# Patient Record
Sex: MALE | Race: BLACK OR AFRICAN AMERICAN | NOT HISPANIC OR LATINO | URBAN - METROPOLITAN AREA
[De-identification: names, ages, dates, MRNs, and addresses within clinical notes are randomized per-mention and may not be internally consistent; named-entity substitution may affect disease eponyms.]

---

## 2017-04-27 ENCOUNTER — EMERGENCY (EMERGENCY)
Facility: HOSPITAL | Age: 32
LOS: 1 days | Discharge: HOME | End: 2017-04-27
Attending: EMERGENCY MEDICINE | Admitting: EMERGENCY MEDICINE
Payer: MEDICAID

## 2017-04-27 VITALS
OXYGEN SATURATION: 97 % | DIASTOLIC BLOOD PRESSURE: 108 MMHG | SYSTOLIC BLOOD PRESSURE: 191 MMHG | RESPIRATION RATE: 18 BRPM | TEMPERATURE: 98 F | HEART RATE: 90 BPM

## 2017-04-27 VITALS
OXYGEN SATURATION: 98 % | SYSTOLIC BLOOD PRESSURE: 134 MMHG | DIASTOLIC BLOOD PRESSURE: 83 MMHG | HEART RATE: 77 BPM | RESPIRATION RATE: 18 BRPM
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
## 2017-04-27 DIAGNOSIS — Z79.899 OTHER LONG TERM (CURRENT) DRUG THERAPY: ICD-10-CM

## 2017-04-27 DIAGNOSIS — L72.3 SEBACEOUS CYST: Chronic | ICD-10-CM
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
## 2017-04-27 DIAGNOSIS — M25.551 PAIN IN RIGHT HIP: ICD-10-CM

## 2017-04-27 DIAGNOSIS — Z91.018 ALLERGY TO OTHER FOODS: ICD-10-CM

## 2017-04-27 DIAGNOSIS — R20.2 PARESTHESIA OF SKIN: ICD-10-CM

## 2017-04-27 DIAGNOSIS — M54.5 LOW BACK PAIN: ICD-10-CM

## 2017-04-27 DIAGNOSIS — M25.552 PAIN IN LEFT HIP: ICD-10-CM
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
## 2017-04-27 DIAGNOSIS — Z79.82 LONG TERM (CURRENT) USE OF ASPIRIN: ICD-10-CM

## 2017-04-27 DIAGNOSIS — I10 ESSENTIAL (PRIMARY) HYPERTENSION: ICD-10-CM

## 2017-04-27 LAB
APPEARANCE UR: CLEAR — SIGNIFICANT CHANGE UP
BILIRUB UR-MCNC: NEGATIVE — SIGNIFICANT CHANGE UP
COLOR SPEC: YELLOW — SIGNIFICANT CHANGE UP
DIFF PNL FLD: NEGATIVE — SIGNIFICANT CHANGE UP
GLUCOSE UR QL: NEGATIVE — SIGNIFICANT CHANGE UP
KETONES UR-MCNC: ABNORMAL
LEUKOCYTE ESTERASE UR-ACNC: ABNORMAL
NITRITE UR-MCNC: NEGATIVE — SIGNIFICANT CHANGE UP
PH UR: 5.5 — SIGNIFICANT CHANGE UP (ref 5–8)
PROT UR-MCNC: SIGNIFICANT CHANGE UP
RBC CASTS # UR COMP ASSIST: SIGNIFICANT CHANGE UP /HPF (ref 0–2)
SP GR SPEC: >1.03 — HIGH (ref 1.01–1.02)
UROBILINOGEN FLD QL: NEGATIVE — SIGNIFICANT CHANGE UP

## 2017-04-27 PROCEDURE — 72190 X-RAY EXAM OF PELVIS: CPT | Mod: 26

## 2017-04-27 PROCEDURE — 72190 X-RAY EXAM OF PELVIS: CPT

## 2017-04-27 PROCEDURE — 81001 URINALYSIS AUTO W/SCOPE: CPT

## 2017-04-27 PROCEDURE — 99283 EMERGENCY DEPT VISIT LOW MDM: CPT | Mod: 25

## 2017-04-27 PROCEDURE — 99284 EMERGENCY DEPT VISIT MOD MDM: CPT

## 2017-04-27 RX ORDER — IBUPROFEN 200 MG
600 TABLET ORAL ONCE
Qty: 0 | Refills: 0 | Status: COMPLETED | OUTPATIENT
Start: 2017-04-27 | End: 2017-04-27

## 2017-04-27 RX ADMIN — Medication 600 MILLIGRAM(S): at 14:03

## 2017-04-27 RX ADMIN — Medication 600 MILLIGRAM(S): at 14:55

## 2017-04-27 NOTE — ED PROVIDER NOTE - CONSTITUTIONAL, MLM
normal... Obese male, awake, alert, oriented to person, place, time/situation and in no apparent distress.

## 2017-04-27 NOTE — ED ADULT NURSE REASSESSMENT NOTE - NS ED NURSE REASSESS COMMENT FT1
Received report from MORENO Shabazz. Safety checked. Comfort care provided. Pt encouraged to call for assist when needed. pending re-evaluation and disposition.

## 2017-04-27 NOTE — ED PROVIDER NOTE - OBJECTIVE STATEMENT
30yo obese M with PMH pre-DM, HTN (on no meds), L knee surgery  presenting with b/l hip and lower back pain x 4 weeks. Reports he went to gym 4 weeks ago for 3 days in a row after not going for several months, and used eliptical that he never used before. Was sore after and 1 week later was cleaning at home and heard pop in lower back. Has since had lower back abd b/l hip pain radiating to groin. Pain is constant, occuring at rest, sitting, moving. Took extra strength advil with relief yesterday. Denies fever/chills, neck pain, numbness/tingling, bowel/bladder dysfunction, known injury. 30yo obese M with PMH pre-DM, HTN (on no meds), L knee surgery  presenting with b/l hip and lower back pain x 4 weeks. Reports he went to gym 4 weeks ago for 3 days in a row after not going for several months, and used eliptical that he never used before. Was sore after and 1 week later was cleaning at home and heard pop in lower back. Has since had lower back abd b/l hip pain radiating to groin. Pain is constant, occuring at rest, sitting, moving. Took extra strength advil with relief yesterday. Pt reports chronic L finger tingling since prior shoulder injury. Denies fever/chills, neck pain, new numbness/tingling, bowel/bladder dysfunction, dysuria, hematuria, known injury.

## 2017-04-27 NOTE — ED PROVIDER NOTE - PROGRESS NOTE DETAILS
Patient feels much better, ambulating without difficulty. BP improved. Advised about trace blood in ua. Recommend f/u with pmd.

## 2017-04-27 NOTE — ED PROVIDER NOTE - MEDICAL DECISION MAKING DETAILS
Wilmer: Patient with low back pain x 3-4 weeks, worse with movement, worse with flexion, no bowel/bladder incontinence, no dysuria/hematuria. no tingling. Patient ambulating. States improves with motrin. patient is morbidly obese, will get pelvis xray and pain control, reassess. Likely msk pain. Patient advsied to f/u with ortho.

## 2017-04-27 NOTE — ED PROVIDER NOTE - PLAN OF CARE
- Stay hydrated. Apply ice 20mins on, 40mins off in cycle for first 24-48 hours. Then apply heat.  - Take ibuprofen 600mg every 8hrs for pain as needed. Take with food.  - Follow up with your pcp in 1-2 days.  - Follow up with ortho in 2-3 days.  - Return to ED if symptoms worsen, fever, weakness, numbness or any other concerning symptoms. - Stay hydrated. No heavy lifting. Apply ice 20mins on, 40mins off in cycle for first 24-48 hours. Then apply heat.  - Take ibuprofen 600mg every 8hrs for pain as needed. Take with food.  - Follow up with your Primary Care provider in 1-2 days.  - Follow up with orthopedic in 2-3 days (information provided).  - Return to ED if symptoms worsen, fever, weakness, numbness, bowel or bladder incontinence, or any other concerning symptoms. - Stay hydrated. No heavy lifting. Apply ice 20mins on, 40mins off in cycle for first 24-48 hours. Then apply heat.  - Take ibuprofen 600mg every 8hrs for pain as needed. Take with food.  - Follow up with your Primary Care provider in 1-2 days. Take copy of printed results with you.  - Follow up with orthopedic in 2-3 days (information provided).  - Return to ED if symptoms worsen, fever, weakness, numbness, bowel or bladder incontinence, or any other concerning symptoms.

## 2017-04-27 NOTE — ED PROVIDER NOTE - CARE PLAN
Instructions for follow-up, activity and diet:	- Stay hydrated. Apply ice 20mins on, 40mins off in cycle for first 24-48 hours. Then apply heat.  - Take ibuprofen 600mg every 8hrs for pain as needed. Take with food.  - Follow up with your pcp in 1-2 days.  - Follow up with ortho in 2-3 days.  - Return to ED if symptoms worsen, fever, weakness, numbness or any other concerning symptoms. Instructions for follow-up, activity and diet:	- Stay hydrated. No heavy lifting. Apply ice 20mins on, 40mins off in cycle for first 24-48 hours. Then apply heat.  - Take ibuprofen 600mg every 8hrs for pain as needed. Take with food.  - Follow up with your Primary Care provider in 1-2 days.  - Follow up with orthopedic in 2-3 days (information provided).  - Return to ED if symptoms worsen, fever, weakness, numbness, bowel or bladder incontinence, or any other concerning symptoms. Instructions for follow-up, activity and diet:	- Stay hydrated. No heavy lifting. Apply ice 20mins on, 40mins off in cycle for first 24-48 hours. Then apply heat.  - Take ibuprofen 600mg every 8hrs for pain as needed. Take with food.  - Follow up with your Primary Care provider in 1-2 days. Take copy of printed results with you.  - Follow up with orthopedic in 2-3 days (information provided).  - Return to ED if symptoms worsen, fever, weakness, numbness, bowel or bladder incontinence, or any other concerning symptoms. Principal Discharge DX:	Bilateral low back pain without sciatica, unspecified chronicity  Instructions for follow-up, activity and diet:	- Stay hydrated. No heavy lifting. Apply ice 20mins on, 40mins off in cycle for first 24-48 hours. Then apply heat.  - Take ibuprofen 600mg every 8hrs for pain as needed. Take with food.  - Follow up with your Primary Care provider in 1-2 days. Take copy of printed results with you.  - Follow up with orthopedic in 2-3 days (information provided).  - Return to ED if symptoms worsen, fever, weakness, numbness, bowel or bladder incontinence, or any other concerning symptoms.

## 2017-04-27 NOTE — ED ADULT NURSE NOTE - OBJECTIVE STATEMENT
No distress. alert and orientedX4. Breathing easy and non labored. ambulatory. calm and cooperative. Emotional support offered

## 2018-11-09 NOTE — ED ADULT TRIAGE NOTE - HEART RATE (BEATS/MIN)
90
Implemented All Fall with Harm Risk Interventions:  [place] to call system. Call bell, personal items and telephone within reach. Instruct patient to call for assistance. Room bathroom lighting operational. Non-slip footwear when patient is off stretcher. Physically safe environment: no spills, clutter or unnecessary equipment. Stretcher in lowest position, wheels locked, appropriate side rails in place. Provide visual cue, wrist band, yellow gown, etc. Monitor gait and stability. Monitor for mental status changes and reorient to person, place, and time. Review medications for side effects contributing to fall risk. Reinforce activity limits and safety measures with patient and family. Provide visual clues: red socks.

## 2020-03-18 ENCOUNTER — EMERGENCY (EMERGENCY)
Facility: HOSPITAL | Age: 35
LOS: 1 days | Discharge: HOME | End: 2020-03-18
Attending: EMERGENCY MEDICINE
Payer: COMMERCIAL

## 2020-03-18 VITALS
HEIGHT: 67 IN | DIASTOLIC BLOOD PRESSURE: 99 MMHG | SYSTOLIC BLOOD PRESSURE: 172 MMHG | RESPIRATION RATE: 18 BRPM | HEART RATE: 79 BPM | TEMPERATURE: 99 F | WEIGHT: 315 LBS | OXYGEN SATURATION: 99 %

## 2020-03-18 VITALS
TEMPERATURE: 98 F | RESPIRATION RATE: 17 BRPM | DIASTOLIC BLOOD PRESSURE: 89 MMHG | HEART RATE: 74 BPM | SYSTOLIC BLOOD PRESSURE: 148 MMHG | OXYGEN SATURATION: 100 %

## 2020-03-18 DIAGNOSIS — L72.3 SEBACEOUS CYST: Chronic | ICD-10-CM

## 2020-03-18 LAB — RAPID RVP RESULT: SIGNIFICANT CHANGE UP

## 2020-03-18 PROCEDURE — 99284 EMERGENCY DEPT VISIT MOD MDM: CPT

## 2020-03-18 PROCEDURE — U0001: CPT

## 2020-03-18 PROCEDURE — 87486 CHLMYD PNEUM DNA AMP PROBE: CPT

## 2020-03-18 PROCEDURE — 87633 RESP VIRUS 12-25 TARGETS: CPT

## 2020-03-18 PROCEDURE — 99283 EMERGENCY DEPT VISIT LOW MDM: CPT

## 2020-03-18 PROCEDURE — 87798 DETECT AGENT NOS DNA AMP: CPT

## 2020-03-18 PROCEDURE — 87581 M.PNEUMON DNA AMP PROBE: CPT

## 2020-03-18 NOTE — ED PROVIDER NOTE - NSFOLLOWUPINSTRUCTIONS_ED_ALL_ED_FT
You were seen and evaluated for infection which may be COVID 19. Testing was done. We think you are safe for discharge and to follow up in a few days with your doctor by phone or in person.   You should treat fevers by taking advil or tylenol as needed.    You should stay hydrated and increase the amount of fluid you drink.  Return to the Emergency Department if your shortness of breath becomes worse, you become weak, or new symptoms develop.    You should monitor your temperature and quarantine yourself away from others - particularly the elderly, ill, or immunosuppressed - for the next 14 days, or until you find out that you do not have COVID19.    Should your COVID19 viral swab that was performed today result POSITIVE you will be contacted by phone at the number you provided when registered for this visit.  Your COVID19 test results will not result for up to 7 days.      DO NOT CALL THE EMERGENCY DEPARTMENT FOR YOUR TEST RESULTS, you may call [phone].

## 2020-03-18 NOTE — ED PROVIDER NOTE - NS ED ROS FT
GENERAL: see HPI  HEENT: No trouble swallowing, normal voice/speaking  CARDIAC: No chest pain, edema  RESPIRATORY: see HPI  ABDOMINAL: No abdominal pains or diarrhea  URINARY: No dysuria or frequency    Other review of systems is negative unless otherwise documented

## 2020-03-18 NOTE — ED ADULT NURSE NOTE - OBJECTIVE STATEMENT
34 male hx HTN not on medication here with rhinorrhea and fever 3 weeks ago, r/o covid since he has a known sick contact. no CP, no SOB.

## 2020-03-18 NOTE — ED PROVIDER NOTE - OBJECTIVE STATEMENT
The patient presents out of a concern for a possible COVID infection, requesting testing.  Recent travel to high risk COVID-19 area: [] Yes [*] No  Close contact with lab diagnosed COVID-19: [] Yes [*] No    This patient complains: [*] cough [] sore throat [] myalgias []nausea []shortness of breath []asymptomatic  Associated symptoms:  no chest pain, abdominal pain, difficulty swallowing  Duration of symptoms:  one day  Aggravating/alleviating: none    Social History   Smoking history: [] Yes [*] No

## 2020-03-18 NOTE — ED PROVIDER NOTE - PHYSICAL EXAMINATION
General: Non toxic appearing, vital signs within normal limits  HEENT: airway patent  Respiratory: no noted respiratory distress  Neuro: ambulatory without difficulty  Psych: appropriate mood and affect

## 2020-03-18 NOTE — ED PROVIDER NOTE - PROGRESS NOTE DETAILS
This patient is a non-[facility] employee and seen in the Emergency Department or Emergency Department surge testing area.  Appropriate PPE used by staff.

## 2020-03-18 NOTE — ED PROVIDER NOTE - PATIENT PORTAL LINK FT
You can access the FollowMyHealth Patient Portal offered by [facility] by registering at the following website: [email or web address]. By joining [employer]’s FollowMyHealth portal, you will also be able to view your health information using other applications (apps) compatible with our system.

## 2020-03-18 NOTE — ED PROVIDER NOTE - CLINICAL SUMMARY MEDICAL DECISION MAKING FREE TEXT BOX
Patient presenting for COVID-19 testing.  Will follow current [facility] COVID-19 testing algorithm.  Patient presentation does not suggest severe illness requiring further emergent intervention or hospitalization at this time.

## 2020-03-20 LAB — SARS-COV-2 RNA SPEC QL NAA+PROBE: DETECTED

## 2025-09-06 ENCOUNTER — EMERGENCY (EMERGENCY)
Facility: HOSPITAL | Age: 40
LOS: 1 days | End: 2025-09-06
Attending: EMERGENCY MEDICINE
Payer: MEDICAID

## 2025-09-06 VITALS
TEMPERATURE: 98 F | WEIGHT: 315 LBS | HEIGHT: 69 IN | HEART RATE: 100 BPM | OXYGEN SATURATION: 98 % | RESPIRATION RATE: 18 BRPM | SYSTOLIC BLOOD PRESSURE: 146 MMHG | DIASTOLIC BLOOD PRESSURE: 96 MMHG

## 2025-09-06 DIAGNOSIS — L72.3 SEBACEOUS CYST: Chronic | ICD-10-CM

## 2025-09-06 LAB
ALBUMIN SERPL ELPH-MCNC: 3.4 G/DL — SIGNIFICANT CHANGE UP (ref 3.3–5)
ALBUMIN SERPL ELPH-MCNC: 4 G/DL — SIGNIFICANT CHANGE UP (ref 3.3–5)
ALP SERPL-CCNC: 74 U/L — SIGNIFICANT CHANGE UP (ref 40–120)
ALP SERPL-CCNC: 85 U/L — SIGNIFICANT CHANGE UP (ref 40–120)
ALT FLD-CCNC: 14 U/L — SIGNIFICANT CHANGE UP (ref 10–45)
ALT FLD-CCNC: <5 U/L — LOW (ref 10–45)
ANION GAP SERPL CALC-SCNC: 13 MMOL/L — SIGNIFICANT CHANGE UP (ref 5–17)
ANION GAP SERPL CALC-SCNC: 17 MMOL/L — SIGNIFICANT CHANGE UP (ref 5–17)
AST SERPL-CCNC: 21 U/L — SIGNIFICANT CHANGE UP (ref 10–40)
AST SERPL-CCNC: 59 U/L — HIGH (ref 10–40)
BASOPHILS # BLD AUTO: 0.06 K/UL — SIGNIFICANT CHANGE UP (ref 0–0.2)
BASOPHILS NFR BLD AUTO: 0.9 % — SIGNIFICANT CHANGE UP (ref 0–2)
BILIRUB SERPL-MCNC: 0.4 MG/DL — SIGNIFICANT CHANGE UP (ref 0.2–1.2)
BILIRUB SERPL-MCNC: 0.5 MG/DL — SIGNIFICANT CHANGE UP (ref 0.2–1.2)
BUN SERPL-MCNC: 8 MG/DL — SIGNIFICANT CHANGE UP (ref 7–23)
BUN SERPL-MCNC: 9 MG/DL — SIGNIFICANT CHANGE UP (ref 7–23)
CALCIUM SERPL-MCNC: 8.1 MG/DL — LOW (ref 8.4–10.5)
CALCIUM SERPL-MCNC: 9.6 MG/DL — SIGNIFICANT CHANGE UP (ref 8.4–10.5)
CHLORIDE SERPL-SCNC: 104 MMOL/L — SIGNIFICANT CHANGE UP (ref 96–108)
CHLORIDE SERPL-SCNC: 95 MMOL/L — LOW (ref 96–108)
CO2 SERPL-SCNC: 17 MMOL/L — LOW (ref 22–31)
CO2 SERPL-SCNC: 20 MMOL/L — LOW (ref 22–31)
CREAT SERPL-MCNC: 0.58 MG/DL — SIGNIFICANT CHANGE UP (ref 0.5–1.3)
CREAT SERPL-MCNC: 0.69 MG/DL — SIGNIFICANT CHANGE UP (ref 0.5–1.3)
EGFR: 119 ML/MIN/1.73M2 — SIGNIFICANT CHANGE UP
EGFR: 119 ML/MIN/1.73M2 — SIGNIFICANT CHANGE UP
EGFR: 126 ML/MIN/1.73M2 — SIGNIFICANT CHANGE UP
EGFR: 126 ML/MIN/1.73M2 — SIGNIFICANT CHANGE UP
EOSINOPHIL # BLD AUTO: 0.1 K/UL — SIGNIFICANT CHANGE UP (ref 0–0.5)
EOSINOPHIL NFR BLD AUTO: 1.5 % — SIGNIFICANT CHANGE UP (ref 0–6)
FLUAV AG NPH QL: SIGNIFICANT CHANGE UP
FLUBV AG NPH QL: SIGNIFICANT CHANGE UP
GLUCOSE SERPL-MCNC: 267 MG/DL — HIGH (ref 70–99)
GLUCOSE SERPL-MCNC: 364 MG/DL — HIGH (ref 70–99)
HCT VFR BLD CALC: 47.4 % — SIGNIFICANT CHANGE UP (ref 39–50)
HGB BLD-MCNC: 15 G/DL — SIGNIFICANT CHANGE UP (ref 13–17)
IMM GRANULOCYTES # BLD AUTO: 0.03 K/UL — SIGNIFICANT CHANGE UP (ref 0–0.07)
IMM GRANULOCYTES NFR BLD AUTO: 0.4 % — SIGNIFICANT CHANGE UP (ref 0–0.9)
LYMPHOCYTES # BLD AUTO: 1.81 K/UL — SIGNIFICANT CHANGE UP (ref 1–3.3)
LYMPHOCYTES NFR BLD AUTO: 26.7 % — SIGNIFICANT CHANGE UP (ref 13–44)
MCHC RBC-ENTMCNC: 26.1 PG — LOW (ref 27–34)
MCHC RBC-ENTMCNC: 31.6 G/DL — LOW (ref 32–36)
MCV RBC AUTO: 82.6 FL — SIGNIFICANT CHANGE UP (ref 80–100)
MONOCYTES # BLD AUTO: 0.6 K/UL — SIGNIFICANT CHANGE UP (ref 0–0.9)
MONOCYTES NFR BLD AUTO: 8.8 % — SIGNIFICANT CHANGE UP (ref 2–14)
NEUTROPHILS # BLD AUTO: 4.18 K/UL — SIGNIFICANT CHANGE UP (ref 1.8–7.4)
NEUTROPHILS NFR BLD AUTO: 61.7 % — SIGNIFICANT CHANGE UP (ref 43–77)
NRBC # BLD AUTO: 0 K/UL — SIGNIFICANT CHANGE UP (ref 0–0)
NRBC # FLD: 0 K/UL — SIGNIFICANT CHANGE UP (ref 0–0)
NRBC BLD AUTO-RTO: 0 /100 WBCS — SIGNIFICANT CHANGE UP (ref 0–0)
PLATELET # BLD AUTO: 315 K/UL — SIGNIFICANT CHANGE UP (ref 150–400)
PMV BLD: 11.1 FL — SIGNIFICANT CHANGE UP (ref 7–13)
POTASSIUM SERPL-MCNC: 4.6 MMOL/L — SIGNIFICANT CHANGE UP (ref 3.5–5.3)
POTASSIUM SERPL-MCNC: SIGNIFICANT CHANGE UP MMOL/L (ref 3.5–5.3)
POTASSIUM SERPL-SCNC: 4.6 MMOL/L — SIGNIFICANT CHANGE UP (ref 3.5–5.3)
POTASSIUM SERPL-SCNC: SIGNIFICANT CHANGE UP MMOL/L (ref 3.5–5.3)
PROT SERPL-MCNC: 6.2 G/DL — SIGNIFICANT CHANGE UP (ref 6–8.3)
PROT SERPL-MCNC: 8 G/DL — SIGNIFICANT CHANGE UP (ref 6–8.3)
RBC # BLD: 5.74 M/UL — SIGNIFICANT CHANGE UP (ref 4.2–5.8)
RBC # FLD: 12.4 % — SIGNIFICANT CHANGE UP (ref 10.3–14.5)
RSV RNA NPH QL NAA+NON-PROBE: SIGNIFICANT CHANGE UP
SARS-COV-2 RNA SPEC QL NAA+PROBE: SIGNIFICANT CHANGE UP
SODIUM SERPL-SCNC: 129 MMOL/L — LOW (ref 135–145)
SODIUM SERPL-SCNC: 137 MMOL/L — SIGNIFICANT CHANGE UP (ref 135–145)
SOURCE RESPIRATORY: SIGNIFICANT CHANGE UP
TROPONIN T, HIGH SENSITIVITY RESULT: <6 NG/L — SIGNIFICANT CHANGE UP
WBC # BLD: 6.78 K/UL — SIGNIFICANT CHANGE UP (ref 3.8–10.5)
WBC # FLD AUTO: 6.78 K/UL — SIGNIFICANT CHANGE UP (ref 3.8–10.5)

## 2025-09-06 PROCEDURE — 84484 ASSAY OF TROPONIN QUANT: CPT

## 2025-09-06 PROCEDURE — 99285 EMERGENCY DEPT VISIT HI MDM: CPT | Mod: 25

## 2025-09-06 PROCEDURE — 71046 X-RAY EXAM CHEST 2 VIEWS: CPT

## 2025-09-06 PROCEDURE — 93010 ELECTROCARDIOGRAM REPORT: CPT

## 2025-09-06 PROCEDURE — 85025 COMPLETE CBC W/AUTO DIFF WBC: CPT

## 2025-09-06 PROCEDURE — 87637 SARSCOV2&INF A&B&RSV AMP PRB: CPT

## 2025-09-06 PROCEDURE — 99285 EMERGENCY DEPT VISIT HI MDM: CPT

## 2025-09-06 PROCEDURE — 80053 COMPREHEN METABOLIC PANEL: CPT

## 2025-09-06 PROCEDURE — 93005 ELECTROCARDIOGRAM TRACING: CPT

## 2025-09-06 PROCEDURE — 71046 X-RAY EXAM CHEST 2 VIEWS: CPT | Mod: 26

## 2025-09-06 RX ORDER — KETOROLAC TROMETHAMINE 30 MG/ML
15 INJECTION, SOLUTION INTRAMUSCULAR; INTRAVENOUS ONCE
Refills: 0 | Status: DISCONTINUED | OUTPATIENT
Start: 2025-09-06 | End: 2025-09-06

## 2025-09-07 VITALS
RESPIRATION RATE: 18 BRPM | SYSTOLIC BLOOD PRESSURE: 145 MMHG | HEART RATE: 88 BPM | OXYGEN SATURATION: 98 % | DIASTOLIC BLOOD PRESSURE: 93 MMHG | TEMPERATURE: 98 F